# Patient Record
Sex: MALE | Race: WHITE
[De-identification: names, ages, dates, MRNs, and addresses within clinical notes are randomized per-mention and may not be internally consistent; named-entity substitution may affect disease eponyms.]

---

## 2020-08-18 NOTE — NUR
pt is resting quietly on gurney, resp even, rapid, talking 5-6 words sentences, 
sitting in high fowlers, xray and labs done, plan to admit pt to hospital for 
pneumonia in rt lung per Dr Samuel, pt's wife is aware of plan, 2nd liter NS 
infusing w/o

## 2020-08-18 NOTE — NUR
Dr Samuel at bedside to evaluate pt, pt c/o SOB, talking 2-3 sentences, 02 sat 
88 on 4 liters nasal cannula, increased to 6 liter 02 nasal cannula, IVF bolus 
stopped per Dr Samuel, pt has received a total of 1900ml NS,

## 2020-08-18 NOTE — NUR
Patient in room CICU 2007. I have received report from ER RN and had the opportunity to ask 
questions and assume patient care.

## 2020-08-19 NOTE — NUR
Problems reprioritized. Patient report given, questions answered & plan of care reviewed 
with Jeanne MOLINA.

## 2020-08-19 NOTE — NUR
Problems reprioritized. Patient report given, questions answered & plan of care reviewed 
with TRACY Dupont. Patient stable at transfer of care.

## 2020-08-19 NOTE — NUR
Problems reprioritized. Patient report given, questions answered & plan of care reviewed 
with Salomon MOLINA.

## 2020-08-19 NOTE — NUR
Patient arrived to room 3026A from the ICU. Patient ambulated from rney to bed. Patient 
vital signs are temp. 97.4F, HR 87, RR 18, 97% on 4L high flow nasal cannula, /82, and 
pain 0/10. Bed locked and lowered, call light in reach, nonskid socks on, and in no acute 
distress. Will continue to monitor.

## 2020-08-20 NOTE — NUR
Patient in room PCU 3026. I have received report from Jeanne MOLINA and had the opportunity to 
ask questions and assume patient care.

## 2020-08-20 NOTE — NUR
Problems reprioritized. Patient report given, questions answered & plan of care reviewed 
with TRACY Angel.

## 2020-08-20 NOTE — NUR
Patient in room PCU 3026. I have received report from TRACY Dupont and had the opportunity 
to ask questions and assume patient care. Patient awake in bed and in no acute distress.

## 2020-08-20 NOTE — NUR
Problems reprioritized. Patient report given, questions answered & plan of care reviewed 
with TRACY Javed. Patient stable at transfer of care.

## 2020-08-21 NOTE — NUR
Patient stable for discharge per MD orders. All discharge information and education reviewed 
with patient before signing necessary paperwork. IV discontinued with catheter in tact, 
telemetry monitor removed and returned, all patient belongings packed and sent with patient, 
and new Rx called in to [facility] in [place].

## 2020-08-21 NOTE — NUR
Patient in room PCU 3026. I have received report from Tegan MOLINA and had the opportunity to 
ask questions and assume patient care.

## 2020-08-21 NOTE — NUR
Problems reprioritized. Patient report given, questions answered & plan of care reviewed 
with Radha MOLINA.

## 2020-10-17 ENCOUNTER — HOSPITAL ENCOUNTER (EMERGENCY)
Dept: HOSPITAL 94 - ER | Age: 65
Discharge: HOME | End: 2020-10-17
Payer: MEDICARE

## 2020-10-17 VITALS — DIASTOLIC BLOOD PRESSURE: 99 MMHG | SYSTOLIC BLOOD PRESSURE: 177 MMHG

## 2020-10-17 VITALS — BODY MASS INDEX: 25.59 KG/M2 | HEIGHT: 72 IN | WEIGHT: 188.94 LBS

## 2020-10-17 DIAGNOSIS — R60.0: ICD-10-CM

## 2020-10-17 DIAGNOSIS — R06.02: Primary | ICD-10-CM

## 2020-10-17 DIAGNOSIS — Z79.899: ICD-10-CM

## 2020-10-17 DIAGNOSIS — Z72.89: ICD-10-CM

## 2020-10-17 LAB
ALBUMIN SERPL BCP-MCNC: 3.8 G/DL (ref 3.4–5)
ALBUMIN/GLOB SERPL: 1.2 {RATIO} (ref 1.1–1.5)
ALP SERPL-CCNC: 100 IU/L (ref 46–116)
ALT SERPL W P-5'-P-CCNC: 26 U/L (ref 12–78)
ANION GAP SERPL CALCULATED.3IONS-SCNC: 9 MMOL/L (ref 8–16)
AST SERPL W P-5'-P-CCNC: 21 U/L (ref 10–37)
BASOPHILS # BLD AUTO: 0 X10'3 (ref 0–0.2)
BASOPHILS NFR BLD AUTO: 0.7 % (ref 0–1)
BILIRUB SERPL-MCNC: 0.9 MG/DL (ref 0.1–1)
BUN SERPL-MCNC: 24 MG/DL (ref 7–18)
BUN/CREAT SERPL: 14.4 (ref 5.4–32)
CALCIUM SERPL-MCNC: 9.2 MG/DL (ref 8.5–10.1)
CHLORIDE SERPL-SCNC: 101 MMOL/L (ref 99–107)
CO2 SERPL-SCNC: 27.5 MMOL/L (ref 24–32)
CREAT SERPL-MCNC: 1.67 MG/DL (ref 0.6–1.1)
EOSINOPHIL # BLD AUTO: 0.1 X10'3 (ref 0–0.9)
EOSINOPHIL NFR BLD AUTO: 2.2 % (ref 0–6)
ERYTHROCYTE [DISTWIDTH] IN BLOOD BY AUTOMATED COUNT: 14.8 % (ref 11.5–14.5)
GFR SERPL CREATININE-BSD FRML MDRD: 41 ML/MIN
GLUCOSE SERPL-MCNC: 103 MG/DL (ref 70–104)
HCT VFR BLD AUTO: 39.8 % (ref 42–52)
HGB BLD-MCNC: 13.5 G/DL (ref 14–17.9)
LYMPHOCYTES # BLD AUTO: 2 X10'3 (ref 1.1–4.8)
LYMPHOCYTES NFR BLD AUTO: 30.7 % (ref 21–51)
MCH RBC QN AUTO: 32.9 PG (ref 27–31)
MCHC RBC AUTO-ENTMCNC: 34 G/DL (ref 33–36.5)
MCV RBC AUTO: 96.7 FL (ref 78–98)
MONOCYTES # BLD AUTO: 0.7 X10'3 (ref 0–0.9)
MONOCYTES NFR BLD AUTO: 10.1 % (ref 2–12)
NEUTROPHILS # BLD AUTO: 3.7 X10'3 (ref 1.8–7.7)
NEUTROPHILS NFR BLD AUTO: 56.3 % (ref 42–75)
PLATELET # BLD AUTO: 223 X10'3 (ref 140–440)
PMV BLD AUTO: 7.7 FL (ref 7.4–10.4)
POTASSIUM SERPL-SCNC: 4.3 MMOL/L (ref 3.5–5.1)
PROT SERPL-MCNC: 7.1 G/DL (ref 6.4–8.2)
RBC # BLD AUTO: 4.11 X10'6 (ref 4.7–6.1)
SODIUM SERPL-SCNC: 137 MMOL/L (ref 135–145)
WBC # BLD AUTO: 6.5 X10'3 (ref 4.5–11)

## 2020-10-17 PROCEDURE — 36415 COLL VENOUS BLD VENIPUNCTURE: CPT

## 2020-10-17 PROCEDURE — 71045 X-RAY EXAM CHEST 1 VIEW: CPT

## 2020-10-17 PROCEDURE — 85025 COMPLETE CBC W/AUTO DIFF WBC: CPT

## 2020-10-17 PROCEDURE — 93005 ELECTROCARDIOGRAM TRACING: CPT

## 2020-10-17 PROCEDURE — 83880 ASSAY OF NATRIURETIC PEPTIDE: CPT

## 2020-10-17 PROCEDURE — 99285 EMERGENCY DEPT VISIT HI MDM: CPT

## 2020-10-17 PROCEDURE — 80053 COMPREHEN METABOLIC PANEL: CPT

## 2020-10-17 PROCEDURE — 84484 ASSAY OF TROPONIN QUANT: CPT

## 2023-06-19 ENCOUNTER — HOSPITAL ENCOUNTER (EMERGENCY)
Dept: HOSPITAL 94 - ER | Age: 68
Discharge: HOME | End: 2023-06-19
Payer: MEDICARE

## 2023-06-19 VITALS — WEIGHT: 177.25 LBS | BODY MASS INDEX: 24.01 KG/M2 | HEIGHT: 72 IN

## 2023-06-19 VITALS — DIASTOLIC BLOOD PRESSURE: 79 MMHG | SYSTOLIC BLOOD PRESSURE: 142 MMHG

## 2023-06-19 DIAGNOSIS — S61.215A: Primary | ICD-10-CM
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
## 2023-06-19 PROCEDURE — 99283 EMERGENCY DEPT VISIT LOW MDM: CPT

## 2023-06-19 PROCEDURE — 73130 X-RAY EXAM OF HAND: CPT

## 2023-06-19 PROCEDURE — 12001 RPR S/N/AX/GEN/TRNK 2.5CM/<: CPT
